# Patient Record
Sex: FEMALE | Race: ASIAN | NOT HISPANIC OR LATINO
[De-identification: names, ages, dates, MRNs, and addresses within clinical notes are randomized per-mention and may not be internally consistent; named-entity substitution may affect disease eponyms.]

---

## 2022-11-10 ENCOUNTER — NON-APPOINTMENT (OUTPATIENT)
Age: 62
End: 2022-11-10

## 2022-11-10 ENCOUNTER — APPOINTMENT (OUTPATIENT)
Dept: CARDIOLOGY | Facility: CLINIC | Age: 62
End: 2022-11-10

## 2022-11-10 VITALS
DIASTOLIC BLOOD PRESSURE: 81 MMHG | BODY MASS INDEX: 21.26 KG/M2 | OXYGEN SATURATION: 99 % | WEIGHT: 120 LBS | HEART RATE: 71 BPM | TEMPERATURE: 98.1 F | RESPIRATION RATE: 16 BRPM | HEIGHT: 63 IN | SYSTOLIC BLOOD PRESSURE: 155 MMHG

## 2022-11-10 DIAGNOSIS — Z86.39 PERSONAL HISTORY OF OTHER ENDOCRINE, NUTRITIONAL AND METABOLIC DISEASE: ICD-10-CM

## 2022-11-10 DIAGNOSIS — I10 ESSENTIAL (PRIMARY) HYPERTENSION: ICD-10-CM

## 2022-11-10 PROBLEM — Z00.00 ENCOUNTER FOR PREVENTIVE HEALTH EXAMINATION: Status: ACTIVE | Noted: 2022-11-10

## 2022-11-10 PROCEDURE — 93306 TTE W/DOPPLER COMPLETE: CPT

## 2022-11-10 PROCEDURE — 99204 OFFICE O/P NEW MOD 45 MIN: CPT | Mod: 25

## 2022-11-10 PROCEDURE — 93000 ELECTROCARDIOGRAM COMPLETE: CPT | Mod: 59

## 2022-11-10 NOTE — HISTORY OF PRESENT ILLNESS
[FreeTextEntry1] : 62 year old female with history of HTN, HLD, DM (a1c 6.6%) not on meds who presents for evaluation of chest pain and exertional SOB x 2-3 years.\par \par Pt states that 1 year ago, she went to a different cardiologist who did a treadmill stress with ?nuclear testing and stated that she had "equivocal" stress findings and subsequently recommended her to obtain LHC. She did not get this done because she was nervous for the procedure. At baseline, if she walks normally, she does not have symptoms. However, when she runs quickly or fast, she feels mid-sternal chest pain that radiates down from her throat and feels like a "bitter and burning" sensation. This is associated with shortness of breath. She also states that she would feel the same symptoms in the cold weather (wintertime) when cold wind blows towards her face, but did not feel these symptoms in the summer. She denies any palpitations, dizziness, lightheadedness, or LOC. No orthopnea/PND or LE edema. \par \par She takes amlodipine 5mg daily, losartan 25mg daily, and pravastatin 40mg daily.\par \par

## 2022-11-10 NOTE — ASSESSMENT
[FreeTextEntry1] : 62 year old female with history of HTN, HLD, DM (a1c 6.6%) not on meds who presents for evaluation of chest pain and exertional SOB x 2-3 years.\par \par Pt states that 1 year ago, she went to a different cardiologist who did a treadmill stress with ?nuclear testing and stated that she had "equivocal" stress findings and subsequently recommended her to obtain LHC. She did not get this done because she was nervous for the procedure. At baseline, if she walks normally, she does not have symptoms. However, when she runs quickly or fast, she feels mid-sternal chest pain that radiates down from her throat and feels like a "bitter and burning" sensation. This is associated with shortness of breath. She also states that she would feel the same symptoms in the cold weather (wintertime) when cold wind blows towards her face, but did not feel these symptoms in the summer. She denies any palpitations, dizziness, lightheadedness, or LOC. No orthopnea/PND or LE edema. \par \par She takes amlodipine 5mg daily, losartan 25mg daily, and pravastatin 40mg daily.\par \par \par 1) LBBB\par 2) chest discomfort, 2-3 years in duration\par - she is euvolemic on exam without signs/symptoms of HF\par - I advised TTE to assess for structural heart disease - showed low normal LVEF 50%, paradoxical septal motion, cannot r/o possible septal hypokinesis, and mild AI\par - in the setting of LBBB, her age, risk factors, symptoms, TTE findings, I advised CTA coronaries to assess for CAD (exercise not recommended as ECG un-interpretable and can cause false positive in MPI). Referral placed out to [facility] (Last BUN/Cr 20/0.68, 10/27/22). We discussed the possibility of needing LHC if CTA is abnormal. \par - on pravastatin 40mg daily for HLD, consider switching to atorvastatin as LDL was 167\par \par 3) HTN, 155/81\par - if still elevated at next visit, would favor increasing losartan\par \par 4) Follow-up, pending CTA coronaries\par \par

## 2022-11-22 ENCOUNTER — NON-APPOINTMENT (OUTPATIENT)
Age: 62
End: 2022-11-22

## 2022-11-29 ENCOUNTER — APPOINTMENT (OUTPATIENT)
Dept: CARDIOLOGY | Facility: CLINIC | Age: 62
End: 2022-11-29

## 2022-11-29 VITALS
SYSTOLIC BLOOD PRESSURE: 135 MMHG | HEART RATE: 76 BPM | WEIGHT: 118 LBS | TEMPERATURE: 97.8 F | DIASTOLIC BLOOD PRESSURE: 81 MMHG | OXYGEN SATURATION: 100 % | BODY MASS INDEX: 20.9 KG/M2 | RESPIRATION RATE: 18 BRPM

## 2022-11-29 PROCEDURE — 99215 OFFICE O/P EST HI 40 MIN: CPT

## 2022-11-29 NOTE — ASSESSMENT
[FreeTextEntry1] : 62 year old female with history of HTN, HLD, DM (a1c 6.6%) not on meds, LBBB who presents for f/u.\par \par Pt last seen 11/10/22 - underwent TTE showing low normal LVEF 50% with paradoxical septal motion and likely septal hypokinesis and mild AI. CTA 11/22/[address] [facility] which showed Ca 177 score, severe pLAD (70%), moderate pLCx (50-69%), and normal RCA.\par \par Pt returns today for f/u. She states she feels well. However, she still states she has mid-sternal chest discomfort that radiates up her throat when she runs quickly or for longer periods of times. No orthopnea, PND, LE edema, palpitations, or lightheadedness.\par \par She takes amlodipine 5mg daily, losartan 25mg daily, and pravastatin 40mg daily.\par \par 1) LBBB\par 2) chest discomfort, 2-3 years in duration\par 3) positive CTA coronaries\par - TTE 11/10/22 showed low normal LVEF 50%, paradoxical septal motion, likely septal hypokinesis, and mild AI\par - CTA 11/22/[address] done [address] [facility] which showed Ca 177 score, severe pLAD (70%), moderate pLCx (50-69%), and normal RCA\par - in setting of low normal EF, likely septal hypokinesis, and exertional chest discomfort, I advised pt to proceed for coronary angiogram ([facility] with Dr. Americo Maravilla). Pt is nervous and extremely hesitant to proceed. I had a long discussion regarding risks/benefits of [facility] in her clinical situation and pt endorsed understanding.\par - switch pravastatin to atorvastatin 40mg daily, might need 80mg pending repeat lipid panel\par - start ASA 81mg daily\par \par 4) HTN, 155/81\par - continue amlodipine and losartan 25mg daily\par \par 4) Follow-up, pending [facility]

## 2022-11-29 NOTE — HISTORY OF PRESENT ILLNESS
[FreeTextEntry1] : 62 year old female with history of HTN, HLD, DM (a1c 6.6%) not on meds, LBBB who presents for f/u.\par \par Pt last seen 11/10/22 - underwent TTE showing low normal LVEF 50% with paradoxical septal motion and likely septal hypokinesis and mild AI. CTA was done at [facility] which showed Ca 177 score, severe pLAD (70%), moderate pLCx (50-69%), and normal RCA.\par \par Pt returns today for f/u. She states she feels well. However, she still states she has mid-sternal chest discomfort that radiates up her throat when she runs quickly or for longer periods of times. No orthopnea, PND, LE edema, palpitations, or lightheadedness.\par \par She takes amlodipine 5mg daily, losartan 25mg daily, and pravastatin 40mg daily.\par

## 2022-12-05 ENCOUNTER — APPOINTMENT (OUTPATIENT)
Dept: CARDIOLOGY | Facility: CLINIC | Age: 62
End: 2022-12-05

## 2022-12-05 ENCOUNTER — NON-APPOINTMENT (OUTPATIENT)
Age: 62
End: 2022-12-05

## 2022-12-05 VITALS
TEMPERATURE: 96.8 F | WEIGHT: 117 LBS | RESPIRATION RATE: 16 BRPM | HEART RATE: 64 BPM | DIASTOLIC BLOOD PRESSURE: 79 MMHG | SYSTOLIC BLOOD PRESSURE: 133 MMHG | OXYGEN SATURATION: 98 % | BODY MASS INDEX: 20.73 KG/M2

## 2022-12-05 DIAGNOSIS — R06.02 SHORTNESS OF BREATH: ICD-10-CM

## 2022-12-05 PROCEDURE — 93000 ELECTROCARDIOGRAM COMPLETE: CPT

## 2022-12-05 PROCEDURE — 99215 OFFICE O/P EST HI 40 MIN: CPT | Mod: 25

## 2022-12-05 RX ORDER — NITROGLYCERIN 0.4 MG/1
0.4 TABLET SUBLINGUAL
Qty: 90 | Refills: 0 | Status: ACTIVE | COMMUNITY
Start: 2022-12-05 | End: 1900-01-01

## 2022-12-05 NOTE — ASSESSMENT
[FreeTextEntry1] : 62 year old female with history of HTN, HLD, DM (a1c 6.6%) not on meds, LBBB who presents for f/u.\par \par Pt states that she switched to atorvastatin and started ASA 81. She feels the ASA gives her mild chest discomfort/SOB. I advised her to try aspirin with her acid reflux medication. She states she still has chest discomfort when she runs very quickly but feels okay walking at normal pace.\par \par She takes amlodipine 5mg daily, losartan 25mg daily, and atorvastatin 40mg daily. She is on ASA 81mg daily.\par \par 1) LBBB\par 2) chest discomfort, 2-3 years in duration\par 3) positive CTA coronaries\par - TTE 11/10/22 showed low normal LVEF 50%, paradoxical septal motion, likely septal hypokinesis, and mild AI\par - CTA 11/22/22 was done at [facility] which showed Ca 177 score, severe pLAD (70%), moderate pLCx (50-69%), and normal RCA\par - in setting of low normal EF, likely septal hypokinesis, and exertional chest discomfort, I advised pt to proceed for coronary angiogram ([facility] with Dr. Americo Maravilla). Pt is nervous and extremely hesitant to proceed. I had a long discussion regarding risks/benefits of C in her clinical situation and pt endorsed understanding. We discussed that should she develop severe CP/SOB, she should proceed to ED for evaluation and not wait for scheduled appt\par - continue ASA 81mg and atorvastatin 40mg daily\par - start metoprolol succinate 25mg daily for anti-anginal effect\par - I Rx'ed SLNTG PRN\par \par 4) HTN,\par - continue amlodipine and losartan 25mg daily\par \par 4) Follow-up, pending C

## 2022-12-05 NOTE — HISTORY OF PRESENT ILLNESS
[FreeTextEntry1] : 62 year old female with history of HTN, HLD, DM (a1c 6.6%) not on meds, LBBB who presents for f/u.\par \par Pt states that she switched to atorvastatin and started ASA 81. She feels the ASA gives her mild chest discomfort/SOB. I advised her to try aspirin with her acid reflux medication. She states she still has chest discomfort when she runs very quickly but feels okay walking at normal pace. Pt brought in a bottle of metoprolol tartrate from her friend and asked if she could take it. \par \par 11/29/22- Pt returns today for f/u. She states she feels well. However, she still states she has mid-sternal chest discomfort that radiates up her throat when she runs quickly or for longer periods of times. No orthopnea, PND, LE edema, palpitations, or lightheadedness. Pravstatin was switched to atorvastatin 40mg at that time. We had long discussion to plan for LHC given septal hypokinesis, low normal EF, chest discomfort and positive coronary CT.\par \par 11/10/22 - underwent TTE showing low normal LVEF 50% with paradoxical septal motion and likely septal hypokinesis and mild AI. CTA was done at [facility] which showed Ca 177 score, severe pLAD (70%), moderate pLCx (50-69%), and normal RCA.\par \par She takes amlodipine 5mg daily, losartan 25mg daily, and atorvastatin 40mg daily.

## 2023-02-17 ENCOUNTER — APPOINTMENT (OUTPATIENT)
Dept: CARDIOLOGY | Facility: CLINIC | Age: 63
End: 2023-02-17
Payer: MEDICAID

## 2023-02-17 ENCOUNTER — NON-APPOINTMENT (OUTPATIENT)
Age: 63
End: 2023-02-17

## 2023-02-17 VITALS
SYSTOLIC BLOOD PRESSURE: 137 MMHG | RESPIRATION RATE: 18 BRPM | TEMPERATURE: 97.5 F | BODY MASS INDEX: 21.79 KG/M2 | HEART RATE: 81 BPM | OXYGEN SATURATION: 98 % | WEIGHT: 123 LBS | DIASTOLIC BLOOD PRESSURE: 80 MMHG

## 2023-02-17 DIAGNOSIS — R07.82 INTERCOSTAL PAIN: ICD-10-CM

## 2023-02-17 DIAGNOSIS — R07.9 CHEST PAIN, UNSPECIFIED: ICD-10-CM

## 2023-02-17 PROCEDURE — 99215 OFFICE O/P EST HI 40 MIN: CPT | Mod: 25

## 2023-02-17 PROCEDURE — 93000 ELECTROCARDIOGRAM COMPLETE: CPT

## 2023-02-17 NOTE — HISTORY OF PRESENT ILLNESS
[FreeTextEntry1] : 62 year old female with history of HTN, HLD, DM (a1c 6.6%) not on meds, LBBB, moderate-severe CAD, low normal LVEF 50% with likely septal hypokinesis who presents for f/u.\par \par Pt states that she still occasionally has mid-chest discomfort that radiates up the middle of her chest to her throat. She is taking the medications as prescribed but feels like she has mild stomach upset with ASA 81. She is nervous about LHC, which is scheduled for 2/21/23.\par \par 12/5/22 - Pt states that she switched to atorvastatin and started ASA 81. She feels the ASA gives her mild chest discomfort/SOB. I advised her to try aspirin with her acid reflux medication. She states she still has chest discomfort when she runs very quickly but feels okay walking at normal pace. Pt brought in a bottle of metoprolol tartrate from her friend and asked if she could take it. \par \par 11/29/22- Pt returns today for f/u. She states she feels well. However, she still states she has mid-sternal chest discomfort that radiates up her throat when she runs quickly or for longer periods of times. No orthopnea, PND, LE edema, palpitations, or lightheadedness. Pravstatin was switched to atorvastatin 40mg at that time. We had long discussion to plan for LHC given septal hypokinesis, low normal EF, chest discomfort and positive coronary CT.\par \par 11/10/22 - underwent TTE showing low normal LVEF 50% with paradoxical septal motion and likely septal hypokinesis and mild AI. CTA was done at [facility] which showed Ca 177 score, severe pLAD (70%), moderate pLCx (50-69%), and normal RCA.\par \par She takes amlodipine 5mg daily, losartan 25mg daily. She is on ASA 81 and atorvastatin 40mg daily. She also takes metoprolol succinate 25mg daily.

## 2023-02-17 NOTE — ASSESSMENT
[FreeTextEntry1] : 62 year old female with history of HTN, HLD, DM (a1c 6.6%) not on meds, LBBB, moderate-severe CAD, low normal LVEF 50% with likely septal hypokinesis who presents for f/u.\par \par Pt states that she still occasionally has mid-chest discomfort that radiates up the middle of her chest to her throat. She is taking the medications as prescribed but feels like she has mild stomach upset with ASA 81. She is nervous about LHC, which is scheduled for 2/21/23.\par \par 11/10/22 - underwent TTE showing low normal LVEF 50% with paradoxical septal motion and likely septal hypokinesis and mild AI. CTA was done at [facility] which showed Ca 177 score, severe pLAD (70%), moderate pLCx (50-69%), and normal RCA.\par \par She takes amlodipine 5mg daily, losartan 25mg daily. She is on ASA 81 and atorvastatin 40mg daily. She also takes metoprolol succinate 25mg daily.\par \par 1) LBBB\par 2) chest discomfort, 2-3 years in duration\par 3) positive CTA coronaries\par - TTE 11/10/22 showed low normal LVEF 50%, paradoxical septal motion, likely septal hypokinesis, and mild AI\par - CTA 11/22/22 was done at [facility] which showed Ca 177 score, severe pLAD (70%), moderate pLCx (50-69%), and normal RCA\par - in setting of low normal EF, likely septal hypokinesis, and exertional chest discomfort, I advised pt to proceed for coronary angiogram ([facility] with Dr. Americo Maravilla). She is scheduled for next week 2/21/23. Pt remains nervous and is still extremely hesitant to proceed. I had another long discussion regarding risks/benefits of LHC in her clinical situation and pt endorsed understanding. ED precautions of worsening CP/SOB was also discussed.\par - continue ASA 81mg and atorvastatin 40mg daily\par - continue metoprolol succinate 25mg daily for anti-anginal effect\par - SLNTG PRN\par \par 4) HTN,\par - continue amlodipine and losartan 25mg daily\par \par 5) Follow-up, pending LHC next week \par

## 2023-04-06 ENCOUNTER — APPOINTMENT (OUTPATIENT)
Dept: CARDIOLOGY | Facility: CLINIC | Age: 63
End: 2023-04-06
Payer: MEDICAID

## 2023-04-06 ENCOUNTER — NON-APPOINTMENT (OUTPATIENT)
Age: 63
End: 2023-04-06

## 2023-04-06 VITALS
WEIGHT: 123 LBS | DIASTOLIC BLOOD PRESSURE: 87 MMHG | SYSTOLIC BLOOD PRESSURE: 153 MMHG | HEART RATE: 75 BPM | BODY MASS INDEX: 21.79 KG/M2 | OXYGEN SATURATION: 99 % | TEMPERATURE: 96.8 F | RESPIRATION RATE: 18 BRPM

## 2023-04-06 PROCEDURE — 99214 OFFICE O/P EST MOD 30 MIN: CPT

## 2023-04-06 PROCEDURE — 93000 ELECTROCARDIOGRAM COMPLETE: CPT

## 2023-04-06 NOTE — REASON FOR VISIT
[FreeTextEntry1] : 62 year old female with  HTN, HLD, DM (a1c 6.6%) not on meds, LBBB, moderate-severe CAD by CTA, low normal LVEF 50% with likely septal hypokinesis who presents for f/u.\par \par Patient was last seen on 2/17/23 with Dr. Pacheco.  Pt stateed that she still occasionally had mid-chest discomfort that radiated up the middle of her chest to her throat. She was taking the medications as prescribed but feels like she has mild stomach upset with ASA 81. She was nervous about [facility], which was scheduled for 2/21/23.\par \par She takes amlodipine 5mg daily, losartan 25mg daily. She is on ASA 81 and atorvastatin 40mg daily. She also takes metoprolol succinate 25mg daily.\par \par Patient underwent an echocardiogram 11/10/22 and it showed low normal LV function EF 50% without significant valvular pathology.

## 2023-04-06 NOTE — ASSESSMENT
[FreeTextEntry1] : 62 year old female with history of HTN, HLD, DM (a1c 6.6%) not on meds, LBBB, moderate-severe CAD, low normal LVEF 50% with likely septal hypokinesis who presents for f/u.\par \par Pt states that she still occasionally has mid-chest discomfort that radiates up the middle of her chest to her throat. She is taking the medications as prescribed but feels like she has mild stomach upset with ASA 81. She is nervous about LHC, which is scheduled for 2/21/23.\par \par 11/10/22 - underwent TTE showing low normal LVEF 50% with paradoxical septal motion and likely septal hypokinesis and mild AI. CTA was done at [facility] which showed Ca 177 score, severe pLAD (70%), moderate pLCx (50-69%), and normal RCA.\par \par She takes amlodipine 5mg daily, losartan 25mg daily. She is on ASA 81 and atorvastatin 40mg daily. She also takes metoprolol succinate 25mg daily.\par \par 1) LBBB\par 2) chest discomfort, 2-3 years in duration\par 3) positive CTA coronaries\par - TTE 11/10/22 showed low normal LVEF 50%, paradoxical septal motion, likely septal hypokinesis, and mild AI\par - CTA 11/22/22 was done at [facility] which showed Ca 177 score, severe pLAD (70%), moderate pLCx (50-69%), and normal RCA\par - in setting of low normal EF, likely septal hypokinesis, and exertional chest discomfort, I advised pt to proceed for coronary angiogram ([facility] with Dr. Americo Maravilla). She is scheduled for next week 2/21/23. Pt remains nervous and is still extremely hesitant to proceed. I had another long discussion regarding risks/benefits of LHC in her clinical situation and pt endorsed understanding. ED precautions of worsening CP/SOB was also discussed.\par - continue ASA 81mg and atorvastatin 40mg daily\par - continue metoprolol succinate 25mg daily for anti-anginal effect\par - SLNTG PRN\par \par 4) HTN,\par - continue amlodipine and losartan 25mg daily\par \par 5) Follow-up, pending LHC next week \par

## 2023-04-06 NOTE — HISTORY OF PRESENT ILLNESS
[FreeTextEntry1] : 4/6/23 - Patient reports CP from throat to epigastric  and took SLNTG yesterday. Patient reports SSCP with exertion normally. She reports stomach discomfort, she had EGD in 2018 which was okay. I advised patient to stop ASA 81 mg and switch to Plavix 75 mg. I advised patient to have a trial of carafate. I advised patient to get BT for troponin. Patient reports palpitations lasting seconds. She did not go for cardiac cath as planned previously. \par \par \par 2/17/23 - Pt states that she still occasionally has mid-chest discomfort that radiates up the middle of her chest to her throat. She is taking the medications as prescribed but feels like she has mild stomach upset with ASA 81. She is nervous about LHC, which is scheduled for 2/21/23.\par \par 12/5/22 - Pt states that she switched to atorvastatin and started ASA 81. She feels the ASA gives her mild chest discomfort/SOB. I advised her to try aspirin with her acid reflux medication. She states she still has chest discomfort when she runs very quickly but feels okay walking at normal pace. Pt brought in a bottle of metoprolol tartrate from her friend and asked if she could take it. \par \par 11/29/22- Pt returns today for f/u. She states she feels well. However, she still states she has mid-sternal chest discomfort that radiates up her throat when she runs quickly or for longer periods of times. No orthopnea, PND, LE edema, palpitations, or lightheadedness. Pravstatin was switched to atorvastatin 40mg at that time. We had long discussion to plan for LHC given septal hypokinesis, low normal EF, chest discomfort and positive coronary CT.\par \par 11/10/22 - underwent TTE showing low normal LVEF 50% with paradoxical septal motion and likely septal hypokinesis and mild AI. CTA was done at [facility] which showed Ca 177 score, severe pLAD (70%), moderate pLCx (50-69%), and normal RCA.\par \par 11/10/22 - Pt states that 1 year ago, she went to a different cardiologist who did a treadmill stress with ?nuclear testing and stated that she had "equivocal" stress findings and subsequently recommended her to obtain LHC. She did not get this done because she was nervous for the procedure. At baseline, if she walks normally, she does not have symptoms. However, when she runs quickly or fast, she feels mid-sternal chest pain that radiates down from her throat and feels like a "bitter and burning" sensation. This is associated with shortness of breath. She also states that she would feel the same symptoms in the cold weather (wintertime) when cold wind blows towards her face, but did not feel these symptoms in the summer. She denies any palpitations, dizziness, lightheadedness, or LOC. No orthopnea/PND or LE edema.   She takes amlodipine 5mg daily, losartan 25mg daily, and pravastatin 40mg daily.

## 2023-04-11 LAB
ALBUMIN SERPL ELPH-MCNC: 4.6 G/DL
ALP BLD-CCNC: 67 U/L
ALT SERPL-CCNC: 23 U/L
ANION GAP SERPL CALC-SCNC: 12 MMOL/L
AST SERPL-CCNC: 21 U/L
BILIRUB SERPL-MCNC: 0.5 MG/DL
BUN SERPL-MCNC: 20 MG/DL
CALCIUM SERPL-MCNC: 10.1 MG/DL
CHLORIDE SERPL-SCNC: 104 MMOL/L
CO2 SERPL-SCNC: 26 MMOL/L
CREAT SERPL-MCNC: 0.8 MG/DL
DEPRECATED D DIMER PPP IA-ACNC: <150 NG/ML DDU
EGFR: 83 ML/MIN/1.73M2
GLUCOSE SERPL-MCNC: 105 MG/DL
NT-PROBNP SERPL-MCNC: 277 PG/ML
POTASSIUM SERPL-SCNC: 4.1 MMOL/L
PROT SERPL-MCNC: 7.5 G/DL
SODIUM SERPL-SCNC: 142 MMOL/L
TROPONIN I SERPL-MCNC: <0.01 NG/ML

## 2023-04-12 ENCOUNTER — NON-APPOINTMENT (OUTPATIENT)
Age: 63
End: 2023-04-12

## 2023-05-15 ENCOUNTER — APPOINTMENT (OUTPATIENT)
Dept: CARDIOLOGY | Facility: CLINIC | Age: 63
End: 2023-05-15
Payer: MEDICAID

## 2023-05-15 ENCOUNTER — NON-APPOINTMENT (OUTPATIENT)
Age: 63
End: 2023-05-15

## 2023-05-15 VITALS
TEMPERATURE: 97.8 F | OXYGEN SATURATION: 100 % | DIASTOLIC BLOOD PRESSURE: 76 MMHG | RESPIRATION RATE: 18 BRPM | BODY MASS INDEX: 21.26 KG/M2 | WEIGHT: 120 LBS | HEART RATE: 71 BPM | SYSTOLIC BLOOD PRESSURE: 128 MMHG

## 2023-05-15 PROCEDURE — 99213 OFFICE O/P EST LOW 20 MIN: CPT | Mod: 25

## 2023-05-15 PROCEDURE — 93000 ELECTROCARDIOGRAM COMPLETE: CPT

## 2023-05-15 RX ORDER — ASPIRIN 81 MG/1
81 TABLET ORAL
Qty: 90 | Refills: 1 | Status: DISCONTINUED | COMMUNITY
Start: 2022-11-29 | End: 2023-05-15

## 2023-05-16 NOTE — ASSESSMENT
[FreeTextEntry1] : 62 year old female with history of HTN, HLD, DM (a1c 6.6%) not on meds, LBBB, moderate-severe CAD, low normal LVEF 50% with likely septal hypokinesis who presents for f/u.\par \par Pt states that she still occasionally has mid-chest discomfort that radiates up the middle of her chest to her throat. She is taking the medications as prescribed but feels like she has mild stomach upset with ASA 81. She is nervous about LHC, which is scheduled for 2/21/23.\par \par 11/10/22 - underwent TTE showing low normal LVEF 50% with paradoxical septal motion and likely septal hypokinesis and mild AI. CTA was done at [facility] which showed Ca 177 score, severe pLAD (70%), moderate pLCx (50-69%), and normal RCA.\par \par She takes amlodipine 5mg daily, losartan 25mg daily. She is on ASA 81 and atorvastatin 40mg daily. She also takes metoprolol succinate 25mg daily.\par \par 1) LBBB\par 2) chest discomfort, 2-3 years in duration\par 3) positive CTA coronaries\par - TTE 11/10/22 showed low normal LVEF 50%, paradoxical septal motion, likely septal hypokinesis, and mild AI\par - CTA 11/22/22 was done at [facility] which showed Ca 177 score, severe pLAD (70%), moderate pLCx (50-69%), and normal RCA\par - in setting of low normal EF, likely septal hypokinesis, and exertional chest discomfort, I advised pt to proceed for coronary angiogram ([facility] with Dr. Americo Maravilla). She is scheduled for next week 2/21/23. Pt remains nervous and is still extremely hesitant to proceed. I had another long discussion regarding risks/benefits of LHC in her clinical situation and pt endorsed understanding. ED precautions of worsening CP/SOB was also discussed.\par - continue ASA 81mg and atorvastatin 40mg daily\par - continue metoprolol succinate 25mg daily for anti-anginal effect\par - SLNTG PRN\par \par 4) HTN,\par - continue amlodipine and losartan 25mg daily\par \par 5) Follow-up, pending LHC next week \par

## 2023-05-16 NOTE — REVIEW OF SYSTEMS
[Chest Discomfort] : chest discomfort [Negative] : Heme/Lymph [SOB] : no shortness of breath [Dyspnea on exertion] : not dyspnea during exertion [Lower Ext Edema] : no extremity edema [Leg Claudication] : no intermittent leg claudication [Orthopnea] : no orthopnea [Palpitations] : no palpitations [PND] : no PND [Syncope] : no syncope

## 2023-05-16 NOTE — DISCUSSION/SUMMARY
[FreeTextEntry1] : 62 year old female with moderate-severe CAD by CTA with calcium score of 177, HTN, HLD, DM (a1c 6.6%) not on meds, LBBB, presents for followup.\par \par Patient was last seen on 4/6/23 - Patient reports CP from throat to epigastric  and took SLNTG yesterday. Patient reports SSCP with exertion normally. She reports stomach discomfort, she had EGD in 2018 which was okay. I advised patient to stop ASA 81 mg and switch to Plavix 75 mg. I advised patient to have a trial of carafate. I advised patient to get BT for troponin. Patient reports palpitations lasting seconds. She did not go for cardiac cath as planned previously. \par \par She is on Metoprolol ER 25 mg, Amlodipine 5 mg , Losartan 25 mg for HTN. She is on Plavix 75 mg and Atorvastatin 40 mg daily.  \par \par Patient underwent an echocardiogram 11/10/22 and it showed low normal LV function EF 50% without significant valvular pathology. \par \par 5/15/23 - Patient reports CP is better after switching ASA 81 mg to Plavix 75 mg and taking Carafate.  Patient needs refills.  I advised patient to undergo EGD.  I will consider cardiac cath if EGD is unremarkable.  Patient is cleared for EGD and anesthesia.  She may hold Plavix 75 mg for 5 days prior to procedure.\par \par (1) Chest discomfort, CAD by CTA - Patient reports CP is better after switching ASA 81 mg to Plavix 75 mg and taking Carafate.  Patient needs refills.  I advised patient to undergo EGD.  I will consider cardiac cath if EGD is unremarkable.  I advised patient to continue Plavix 75 mg and Atorvastatin 40 mg for now.\par \par (2) Cardiac clearance prior to EGD - Patient has been stable.  Patient is cleared for EGD and anesthesia.  She may hold Plavix 75 mg for 5 days prior to procedure.\par \par (3) HTN - Her BP was good.  I advised patient to continue Metoprolol ER 25 mg, Amlodipine 5 mg , Losartan 25 mg for HTN\par \par (4) Followup - pending EGD.\par

## 2023-05-16 NOTE — HISTORY OF PRESENT ILLNESS
[FreeTextEntry1] : 5/15/23 - Patient reports CP is better after switching ASA 81 mg to Plavix 75 mg and taking Carafate.  Patient needs refills.  I advised patient to undergo EGD.  I will consider cardiac cath if EGD is unremarkable.  Patient is cleared for EGD and anesthesia.  She may hold Plavix 75 mg for 5 days prior to procedure.\par \par 4/6/23 - Patient reports CP from throat to epigastric  and took SLNTG yesterday. Patient reports SSCP with exertion normally. She reports stomach discomfort, she had EGD in 2018 which was okay. I advised patient to stop ASA 81 mg and switch to Plavix 75 mg. I advised patient to have a trial of carafate. I advised patient to get BT for troponin. Patient reports palpitations lasting seconds. She did not go for cardiac cath as planned previously. \par \par 2/17/23 - Pt states that she still occasionally has mid-chest discomfort that radiates up the middle of her chest to her throat. She is taking the medications as prescribed but feels like she has mild stomach upset with ASA 81. She is nervous about LHC, which is scheduled for 2/21/23.\par \par 12/5/22 - Pt states that she switched to atorvastatin and started ASA 81. She feels the ASA gives her mild chest discomfort/SOB. I advised her to try aspirin with her acid reflux medication. She states she still has chest discomfort when she runs very quickly but feels okay walking at normal pace. Pt brought in a bottle of metoprolol tartrate from her friend and asked if she could take it. \par \par 11/29/22- Pt returns today for f/u. She states she feels well. However, she still states she has mid-sternal chest discomfort that radiates up her throat when she runs quickly or for longer periods of times. No orthopnea, PND, LE edema, palpitations, or lightheadedness. Pravstatin was switched to atorvastatin 40mg at that time. We had long discussion to plan for LHC given septal hypokinesis, low normal EF, chest discomfort and positive coronary CT.\par \par 11/10/22 - underwent TTE showing low normal LVEF 50% with paradoxical septal motion and likely septal hypokinesis and mild AI. CTA was done at [facility] which showed Ca 177 score, severe pLAD (70%), moderate pLCx (50-69%), and normal RCA.\par \par 11/10/22 - Pt states that 1 year ago, she went to a different cardiologist who did a treadmill stress with ?nuclear testing and stated that she had "equivocal" stress findings and subsequently recommended her to obtain LHC. She did not get this done because she was nervous for the procedure. At baseline, if she walks normally, she does not have symptoms. However, when she runs quickly or fast, she feels mid-sternal chest pain that radiates down from her throat and feels like a "bitter and burning" sensation. This is associated with shortness of breath. She also states that she would feel the same symptoms in the cold weather (wintertime) when cold wind blows towards her face, but did not feel these symptoms in the summer. She denies any palpitations, dizziness, lightheadedness, or LOC. No orthopnea/PND or LE edema.   She takes amlodipine 5mg daily, losartan 25mg daily, and pravastatin 40mg daily.

## 2023-05-16 NOTE — REASON FOR VISIT
[FreeTextEntry1] : 62 year old female with  HTN, HLD, DM (a1c 6.6%) not on meds, LBBB, moderate-severe CAD by CTA with calcium score of 177, low normal LVEF 50% with likely septal hypokinesis who presents for f/u.\par \par Patient was last seen on 4/6/23 - Patient reports CP from throat to epigastric  and took SLNTG yesterday. Patient reports SSCP with exertion normally. She reports stomach discomfort, she had EGD in 2018 which was okay. I advised patient to stop ASA 81 mg and switch to Plavix 75 mg. I advised patient to have a trial of carafate. I advised patient to get BT for troponin. Patient reports palpitations lasting seconds. She did not go for cardiac cath as planned previously. \par \par She is on Metoprolol ER 25 mg, Amlodipine 5 mg , Losartan 25 mg for HTN. She is on Plavix 75 mg and Atorvastatin 40 mg daily.\par \par Patient underwent an echocardiogram 11/10/22 and it showed low normal LV function EF 50% without significant valvular pathology.

## 2023-06-26 ENCOUNTER — APPOINTMENT (OUTPATIENT)
Dept: CARDIOLOGY | Facility: CLINIC | Age: 63
End: 2023-06-26
Payer: MEDICAID

## 2023-06-26 ENCOUNTER — NON-APPOINTMENT (OUTPATIENT)
Age: 63
End: 2023-06-26

## 2023-06-26 VITALS
RESPIRATION RATE: 18 BRPM | DIASTOLIC BLOOD PRESSURE: 69 MMHG | TEMPERATURE: 98 F | WEIGHT: 121 LBS | HEART RATE: 93 BPM | OXYGEN SATURATION: 96 % | SYSTOLIC BLOOD PRESSURE: 105 MMHG | BODY MASS INDEX: 21.43 KG/M2

## 2023-06-26 PROCEDURE — 93000 ELECTROCARDIOGRAM COMPLETE: CPT

## 2023-06-26 PROCEDURE — 99213 OFFICE O/P EST LOW 20 MIN: CPT | Mod: 25

## 2023-06-26 NOTE — REASON FOR VISIT
[FreeTextEntry1] : 62 year old female with  HTN, HLD, DM (a1c 6.6%) not on meds, LBBB, moderate-severe CAD by CTA with calcium score of 177, low normal LVEF 50% with likely septal hypokinesis who presents for f/u.\par \par Patient was last seen on 5/15/23 - Patient reports CP is better after switching ASA 81 mg to Plavix 75 mg and taking Carafate.  Patient needs refills.  I advised patient to undergo EGD.  I will consider cardiac cath if EGD is unremarkable.  Patient is cleared for EGD and anesthesia.  She may hold Plavix 75 mg for 5 days prior to procedure.\par \par She is on Metoprolol ER 25 mg, Amlodipine 5 mg , Losartan 25 mg for HTN. She is on Plavix 75 mg and Atorvastatin 40 mg daily.\par \par Patient underwent an echocardiogram 11/10/22 and it showed low normal LV function EF 50% without significant valvular pathology.

## 2023-08-08 ENCOUNTER — APPOINTMENT (OUTPATIENT)
Dept: CARDIOLOGY | Facility: CLINIC | Age: 63
End: 2023-08-08
Payer: MEDICAID

## 2023-08-08 VITALS
DIASTOLIC BLOOD PRESSURE: 79 MMHG | WEIGHT: 116 LBS | OXYGEN SATURATION: 100 % | BODY MASS INDEX: 20.55 KG/M2 | HEART RATE: 67 BPM | SYSTOLIC BLOOD PRESSURE: 127 MMHG | RESPIRATION RATE: 16 BRPM | TEMPERATURE: 97.6 F

## 2023-08-08 PROCEDURE — 99213 OFFICE O/P EST LOW 20 MIN: CPT

## 2023-08-27 NOTE — HISTORY OF PRESENT ILLNESS
[FreeTextEntry1] : 8/8/23 - Patient reports that her CP is better by 40% but is still persistent. Patient reports CP when anxious and palpitations with heartburn. SOB lasting 1-2 minutes/ 2-3 times a month. Patient is considering cardiac cath after one month. Patient repeated breath test today.   6/26/23 - Patient reports SSCP.  EGD showed esophagitis and gastritis and H. Pylori.  Patient cannot tolerate ASA.     5/15/23 - Patient reports CP is better after switching ASA 81 mg to Plavix 75 mg and taking Carafate.  Patient needs refills.  I advised patient to undergo EGD.  I will consider cardiac cath if EGD is unremarkable.  Patient is cleared for EGD and anesthesia.  She may hold Plavix 75 mg for 5 days prior to procedure.  4/6/23 - Patient reports CP from throat to epigastric  and took SLNTG yesterday. Patient reports SSCP with exertion normally. She reports stomach discomfort, she had EGD in 2018 which was okay. I advised patient to stop ASA 81 mg and switch to Plavix 75 mg. I advised patient to have a trial of carafate. I advised patient to get BT for troponin. Patient reports palpitations lasting seconds. She did not go for cardiac cath as planned previously.   2/17/23 - Pt states that she still occasionally has mid-chest discomfort that radiates up the middle of her chest to her throat. She is taking the medications as prescribed but feels like she has mild stomach upset with ASA 81. She is nervous about [facility], which is scheduled for 2/21/23.  12/5/22 - Pt states that she switched to atorvastatin and started ASA 81. She feels the ASA gives her mild chest discomfort/SOB. I advised her to try aspirin with her acid reflux medication. She states she still has chest discomfort when she runs very quickly but feels okay walking at normal pace. Pt brought in a bottle of metoprolol tartrate from her friend and asked if she could take it.   11/29/22- Pt returns today for f/u. She states she feels well. However, she still states she has mid-sternal chest discomfort that radiates up her throat when she runs quickly or for longer periods of times. No orthopnea, PND, LE edema, palpitations, or lightheadedness. Pravstatin was switched to atorvastatin 40mg at that time. We had long discussion to plan for LHC given septal hypokinesis, low normal EF, chest discomfort and positive coronary CT.  11/10/22 - underwent TTE showing low normal LVEF 50% with paradoxical septal motion and likely septal hypokinesis and mild AI. CTA was done at [facility] which showed Ca 177 score, severe pLAD (70%), moderate pLCx (50-69%), and normal RCA.  11/10/22 - Pt states that 1 year ago, she went to a different cardiologist who did a treadmill stress with ?nuclear testing and stated that she had "equivocal" stress findings and subsequently recommended her to obtain LHC. She did not get this done because she was nervous for the procedure. At baseline, if she walks normally, she does not have symptoms. However, when she runs quickly or fast, she feels mid-sternal chest pain that radiates down from her throat and feels like a "bitter and burning" sensation. This is associated with shortness of breath. She also states that she would feel the same symptoms in the cold weather (wintertime) when cold wind blows towards her face, but did not feel these symptoms in the summer. She denies any palpitations, dizziness, lightheadedness, or LOC. No orthopnea/PND or LE edema.   She takes amlodipine 5mg daily, losartan 25mg daily, and pravastatin 40mg daily.

## 2023-09-06 ENCOUNTER — NON-APPOINTMENT (OUTPATIENT)
Age: 63
End: 2023-09-06

## 2023-09-06 ENCOUNTER — APPOINTMENT (OUTPATIENT)
Dept: CARDIOLOGY | Facility: CLINIC | Age: 63
End: 2023-09-06
Payer: MEDICAID

## 2023-09-06 VITALS
BODY MASS INDEX: 21.43 KG/M2 | SYSTOLIC BLOOD PRESSURE: 117 MMHG | OXYGEN SATURATION: 96 % | DIASTOLIC BLOOD PRESSURE: 75 MMHG | HEART RATE: 78 BPM | WEIGHT: 121 LBS | TEMPERATURE: 97.6 F | RESPIRATION RATE: 18 BRPM

## 2023-09-06 DIAGNOSIS — I44.7 LEFT BUNDLE-BRANCH BLOCK, UNSPECIFIED: ICD-10-CM

## 2023-09-06 PROCEDURE — 99214 OFFICE O/P EST MOD 30 MIN: CPT | Mod: 25

## 2023-09-06 PROCEDURE — 93000 ELECTROCARDIOGRAM COMPLETE: CPT

## 2023-09-06 NOTE — REASON FOR VISIT
[FreeTextEntry1] : 62 year old female with moderate-severe CAD by CTA with calcium score of 177 (11/2022), HTN, HLD, pre-DM (a1c 6.6%) not on meds, LBBB,  low normal LVEF 50% with likely septal hypokinesis, who presents for f/u.  Patient was last seen on 8/8/23 - Patient reports that her CP is better by 40% but is still persistent. Patient reports CP when anxious and palpitations with heartburn. SOB lasting 1-2 minutes/ 2-3 times a month. Patient is considering cardiac cath after one month. Patient repeated breath test today.   She is on Metoprolol ER 25 mg, Amlodipine 5 mg , Losartan 25 mg for HTN. She is on Plavix 75 mg and Atorvastatin 40 mg daily.  She is on DM medication.  Patient underwent an echocardiogram 11/10/22 and it showed low normal LV function EF 50% without significant valvular pathology.

## 2023-09-06 NOTE — HISTORY OF PRESENT ILLNESS
[FreeTextEntry1] : 9/6/23 - Patient reports that she had a repeat breath test and was advised to have a repeat treatment for presumed H. Pylori.  Patient could not tolerate and stopped after 10 days of 14 day course.  She continues to have lower CP not related to exertion.  Patient reports throat discomfort.  Patient reports back of neck discomfort.  She is on DM medications now.  She is on Metoprolol ER 25 mg, Amlodipine 5 mg , Losartan 25 mg for HTN. She is on Plavix 75 mg and Atorvastatin 40 mg daily.  I advised patient to undergo a cardiac cath.  Patient requests [facility].  8/8/23 - Patient reports that her CP is better by 40% but is still persistent. Patient reports CP when anxious and palpitations with heartburn. SOB lasting 1-2 minutes/ 2-3 times a month. Patient is considering cardiac cath after one month. Patient repeated breath test today.   6/26/23 - Patient reports SSCP.  EGD showed esophagitis and gastritis and H. Pylori.  Patient cannot tolerate ASA.    5/15/23 - Patient reports CP is better after switching ASA 81 mg to Plavix 75 mg and taking Carafate.  Patient needs refills.  I advised patient to undergo EGD.  I will consider cardiac cath if EGD is unremarkable.  Patient is cleared for EGD and anesthesia.  She may hold Plavix 75 mg for 5 days prior to procedure.  4/6/23 - Patient reports CP from throat to epigastric  and took SLNTG yesterday. Patient reports SSCP with exertion normally. She reports stomach discomfort, she had EGD in 2018 which was okay. I advised patient to stop ASA 81 mg and switch to Plavix 75 mg. I advised patient to have a trial of carafate. I advised patient to get BT for troponin. Patient reports palpitations lasting seconds. She did not go for cardiac cath as planned previously.   2/17/23 - Pt states that she still occasionally has mid-chest discomfort that radiates up the middle of her chest to her throat. She is taking the medications as prescribed but feels like she has mild stomach upset with ASA 81. She is nervous about LHC, which is scheduled for 2/21/23.  12/5/22 - Pt states that she switched to atorvastatin and started ASA 81. She feels the ASA gives her mild chest discomfort/SOB. I advised her to try aspirin with her acid reflux medication. She states she still has chest discomfort when she runs very quickly but feels okay walking at normal pace. Pt brought in a bottle of metoprolol tartrate from her friend and asked if she could take it.   11/29/22- Pt returns today for f/u. She states she feels well. However, she still states she has mid-sternal chest discomfort that radiates up her throat when she runs quickly or for longer periods of times. No orthopnea, PND, LE edema, palpitations, or lightheadedness. Pravstatin was switched to atorvastatin 40mg at that time. We had long discussion to plan for LHC given septal hypokinesis, low normal EF, chest discomfort and positive coronary CT.  11/10/22 - underwent TTE showing low normal LVEF 50% with paradoxical septal motion and likely septal hypokinesis and mild AI. CTA was done at [facility] which showed Ca 177 score, severe pLAD (70%), moderate pLCx (50-69%), and normal RCA.  11/10/22 - Pt states that 1 year ago, she went to a different cardiologist who did a treadmill stress with ?nuclear testing and stated that she had "equivocal" stress findings and subsequently recommended her to obtain LHC. She did not get this done because she was nervous for the procedure. At baseline, if she walks normally, she does not have symptoms. However, when she runs quickly or fast, she feels mid-sternal chest pain that radiates down from her throat and feels like a "bitter and burning" sensation. This is associated with shortness of breath. She also states that she would feel the same symptoms in the cold weather (wintertime) when cold wind blows towards her face, but did not feel these symptoms in the summer. She denies any palpitations, dizziness, lightheadedness, or LOC. No orthopnea/PND or LE edema.   She takes amlodipine 5mg daily, losartan 25mg daily, and pravastatin 40mg daily.

## 2023-09-21 ENCOUNTER — NON-APPOINTMENT (OUTPATIENT)
Age: 63
End: 2023-09-21

## 2023-10-30 ENCOUNTER — NON-APPOINTMENT (OUTPATIENT)
Age: 63
End: 2023-10-30

## 2023-10-30 ENCOUNTER — APPOINTMENT (OUTPATIENT)
Dept: CARDIOLOGY | Facility: CLINIC | Age: 63
End: 2023-10-30
Payer: MEDICAID

## 2023-10-30 VITALS
TEMPERATURE: 97.2 F | OXYGEN SATURATION: 100 % | BODY MASS INDEX: 21.61 KG/M2 | WEIGHT: 122 LBS | RESPIRATION RATE: 16 BRPM | SYSTOLIC BLOOD PRESSURE: 132 MMHG | HEART RATE: 85 BPM | DIASTOLIC BLOOD PRESSURE: 79 MMHG

## 2023-10-30 DIAGNOSIS — I42.9 CARDIOMYOPATHY, UNSPECIFIED: ICD-10-CM

## 2023-10-30 PROCEDURE — 93000 ELECTROCARDIOGRAM COMPLETE: CPT

## 2023-10-30 PROCEDURE — 99213 OFFICE O/P EST LOW 20 MIN: CPT | Mod: 25

## 2023-10-30 RX ORDER — ATORVASTATIN CALCIUM 40 MG/1
40 TABLET, FILM COATED ORAL
Qty: 90 | Refills: 1 | Status: ACTIVE | COMMUNITY
Start: 2022-11-29 | End: 1900-01-01

## 2023-10-30 RX ORDER — ASPIRIN ENTERIC COATED TABLETS 81 MG 81 MG/1
81 TABLET, DELAYED RELEASE ORAL DAILY
Qty: 90 | Refills: 1 | Status: ACTIVE | COMMUNITY
Start: 2023-10-30 | End: 1900-01-01

## 2023-10-30 RX ORDER — CLOPIDOGREL BISULFATE 75 MG/1
75 TABLET, FILM COATED ORAL DAILY
Qty: 90 | Refills: 1 | Status: ACTIVE | COMMUNITY
Start: 2023-04-06 | End: 1900-01-01

## 2023-10-30 RX ORDER — METOPROLOL SUCCINATE 25 MG/1
25 TABLET, EXTENDED RELEASE ORAL DAILY
Qty: 90 | Refills: 1 | Status: ACTIVE | COMMUNITY
Start: 2022-12-05 | End: 1900-01-01

## 2023-10-30 RX ORDER — FAMOTIDINE 20 MG/1
20 TABLET, FILM COATED ORAL
Qty: 90 | Refills: 1 | Status: ACTIVE | COMMUNITY
Start: 2023-10-30 | End: 1900-01-01

## 2023-10-31 PROBLEM — I42.9 CARDIOMYOPATHY, UNSPECIFIED TYPE: Status: ACTIVE | Noted: 2023-10-31

## 2023-12-22 ENCOUNTER — APPOINTMENT (OUTPATIENT)
Dept: CARDIOLOGY | Facility: CLINIC | Age: 63
End: 2023-12-22
Payer: MEDICAID

## 2023-12-22 ENCOUNTER — NON-APPOINTMENT (OUTPATIENT)
Age: 63
End: 2023-12-22

## 2023-12-22 VITALS
WEIGHT: 120 LBS | SYSTOLIC BLOOD PRESSURE: 124 MMHG | BODY MASS INDEX: 21.26 KG/M2 | RESPIRATION RATE: 16 BRPM | DIASTOLIC BLOOD PRESSURE: 75 MMHG | TEMPERATURE: 96.8 F | OXYGEN SATURATION: 97 % | HEART RATE: 89 BPM

## 2023-12-22 DIAGNOSIS — R07.89 OTHER CHEST PAIN: ICD-10-CM

## 2023-12-22 DIAGNOSIS — Z95.820 PERIPHERAL VASCULAR ANGIOPLASTY STATUS WITH IMPLANTS AND GRAFTS: ICD-10-CM

## 2023-12-22 DIAGNOSIS — I25.10 ATHEROSCLEROTIC HEART DISEASE OF NATIVE CORONARY ARTERY W/OUT ANGINA PECTORIS: ICD-10-CM

## 2023-12-22 PROCEDURE — 99213 OFFICE O/P EST LOW 20 MIN: CPT | Mod: 25

## 2023-12-22 PROCEDURE — 93306 TTE W/DOPPLER COMPLETE: CPT

## 2023-12-22 PROCEDURE — 93000 ELECTROCARDIOGRAM COMPLETE: CPT

## 2023-12-30 PROBLEM — Z95.820 S/P ANGIOPLASTY WITH STENT: Status: ACTIVE | Noted: 2023-10-31

## 2023-12-30 PROBLEM — I25.10 CAD (CORONARY ARTERY DISEASE): Status: ACTIVE | Noted: 2022-11-29

## 2023-12-30 NOTE — HISTORY OF PRESENT ILLNESS
[FreeTextEntry1] : 10/30/23 - Patient reports left-sided CP not related to exertion.  She is on Amlodipine 5 mg , Losartan 25 mg for HTN. She is on ASA 81 mg, Plavix 75 mg, Atorvastatin 40 mg for CAD.  HR a little elevated.  I advised patient to resume Metoprolol ER 25 mg.  I advised patient to resume Famotidine 20 mg.  FU 3 months.    9/6/23 - Patient reports that she had a repeat breath test and was advised to have a repeat treatment for presumed H. Pylori.  Patient could not tolerate and stopped after 10 days of 14 day course.  She continues to have lower CP not related to exertion.  Patient reports throat discomfort.  Patient reports back of neck discomfort.  She is on DM medications now.  She is on Metoprolol ER 25 mg, Amlodipine 5 mg , Losartan 25 mg for HTN. She is on Plavix 75 mg and Atorvastatin 40 mg daily.  I advised patient to undergo a cardiac cath.  Patient requests [facility].  Cath 9/21/23 at [facility] with Dr. Long showed 50-60% LAD, 80-85% LCx, LVEF 50% with apical AK, LVEDP 18, s/p LCx stent.  8/8/23 - Patient reports that her CP is better by 40% but is still persistent. Patient reports CP when anxious and palpitations with heartburn. SOB lasting 1-2 minutes/ 2-3 times a month. Patient is considering cardiac cath after one month. Patient repeated breath test today.   6/26/23 - Patient reports SSCP.  EGD showed esophagitis and gastritis and H. Pylori.  Patient cannot tolerate ASA.    5/15/23 - Patient reports CP is better after switching ASA 81 mg to Plavix 75 mg and taking Carafate.  Patient needs refills.  I advised patient to undergo EGD.  I will consider cardiac cath if EGD is unremarkable.  Patient is cleared for EGD and anesthesia.  She may hold Plavix 75 mg for 5 days prior to procedure.  4/6/23 - Patient reports CP from throat to epigastric  and took SLNTG yesterday. Patient reports SSCP with exertion normally. She reports stomach discomfort, she had EGD in 2018 which was okay. I advised patient to stop ASA 81 mg and switch to Plavix 75 mg. I advised patient to have a trial of carafate. I advised patient to get BT for troponin. Patient reports palpitations lasting seconds. She did not go for cardiac cath as planned previously.   2/17/23 - Pt states that she still occasionally has mid-chest discomfort that radiates up the middle of her chest to her throat. She is taking the medications as prescribed but feels like she has mild stomach upset with ASA 81. She is nervous about LHC, which is scheduled for 2/21/23.  12/5/22 - Pt states that she switched to atorvastatin and started ASA 81. She feels the ASA gives her mild chest discomfort/SOB. I advised her to try aspirin with her acid reflux medication. She states she still has chest discomfort when she runs very quickly but feels okay walking at normal pace. Pt brought in a bottle of metoprolol tartrate from her friend and asked if she could take it.   11/29/22- Pt returns today for f/u. She states she feels well. However, she still states she has mid-sternal chest discomfort that radiates up her throat when she runs quickly or for longer periods of times. No orthopnea, PND, LE edema, palpitations, or lightheadedness. Pravstatin was switched to atorvastatin 40mg at that time. We had long discussion to plan for LHC given septal hypokinesis, low normal EF, chest discomfort and positive coronary CT.  11/10/22 - underwent TTE showing low normal LVEF 50% with paradoxical septal motion and likely septal hypokinesis and mild AI. CTA was done at [facility] which showed Ca 177 score, severe pLAD (70%), moderate pLCx (50-69%), and normal RCA.  11/10/22 - Pt states that 1 year ago, she went to a different cardiologist who did a treadmill stress with ?nuclear testing and stated that she had "equivocal" stress findings and subsequently recommended her to obtain LHC. She did not get this done because she was nervous for the procedure. At baseline, if she walks normally, she does not have symptoms. However, when she runs quickly or fast, she feels mid-sternal chest pain that radiates down from her throat and feels like a "bitter and burning" sensation. This is associated with shortness of breath. She also states that she would feel the same symptoms in the cold weather (wintertime) when cold wind blows towards her face, but did not feel these symptoms in the summer. She denies any palpitations, dizziness, lightheadedness, or LOC. No orthopnea/PND or LE edema.   She takes amlodipine 5mg daily, losartan 25mg daily, and pravastatin 40mg daily.

## 2023-12-30 NOTE — REASON FOR VISIT
[FreeTextEntry1] : 63 year old female with moderate-severe CAD by CTA with calcium score of 177 (11/2022), s/p LCx stent 9/21/23 at [facility], cardiomyopathy EF 50%, LBBB, HTN, HLD, pre-DM (6.6%), who presents for followup.  Patient was last seen on 10/30/23 - Patient reports left-sided CP not related to exertion.  She is on Amlodipine 5 mg , Losartan 25 mg for HTN. She is on ASA 81 mg, Plavix 75 mg, Atorvastatin 40 mg for CAD.  HR a little elevated.  I advised patient to resume Metoprolol ER 25 mg.  I advised patient to resume Famotidine 20 mg.  FU 3 months.    She is on Amlodipine 5 mg , Losartan 25 mg for HTN. She is on ASA 81 mg, Plavix 75 mg, Atorvastatin 40 mg for CAD.  She is on DM medication.  She is OFF Metoprolol ER 25 mg.  Cath 9/21/23 at [facility] with Dr. Long showed 50-60% LAD, 80-85% LCx, LVEF 50% with apical AK, LVEDP 18, s/p LCx stent.  Patient underwent an echocardiogram 11/10/22 and it showed low normal LV function EF 50% without significant valvular pathology.

## 2024-04-16 ENCOUNTER — APPOINTMENT (OUTPATIENT)
Dept: CARDIOLOGY | Facility: CLINIC | Age: 64
End: 2024-04-16
Payer: COMMERCIAL

## 2024-04-16 VITALS
OXYGEN SATURATION: 97 % | WEIGHT: 114 LBS | BODY MASS INDEX: 20.19 KG/M2 | SYSTOLIC BLOOD PRESSURE: 115 MMHG | HEART RATE: 78 BPM | DIASTOLIC BLOOD PRESSURE: 75 MMHG | TEMPERATURE: 96.8 F | RESPIRATION RATE: 18 BRPM

## 2024-04-16 PROCEDURE — 99214 OFFICE O/P EST MOD 30 MIN: CPT

## 2024-04-16 RX ORDER — SUCRALFATE 1 G/1
1 TABLET ORAL
Qty: 30 | Refills: 5 | Status: ACTIVE | COMMUNITY
Start: 2023-04-06 | End: 1900-01-01

## 2024-04-27 NOTE — REASON FOR VISIT
[FreeTextEntry1] : 63 year old female with moderate-severe CAD by CTA with calcium score of 177 (11/2022), s/p LCx stent 9/21/23 at [facility], cardiomyopathy EF 50%, LBBB, HTN, HLD, pre-DM (6.6%), who presents for followup.  Patient was last seen on 10/30/23 - Patient reports left-sided CP not related to exertion.  She is on Amlodipine 5 mg , Losartan 25 mg for HTN. She is on ASA 81 mg, Plavix 75 mg, Atorvastatin 40 mg for CAD.  HR a little elevated.  I advised patient to resume Metoprolol ER 25 mg.  I advised patient to resume Famotidine 20 mg.  FU 3 months.    She is on Amlodipine 5 mg , Losartan 25 mg for HTN. She is on ASA 81 mg, Plavix 75 mg, Atorvastatin 40 mg for CAD.  She is on DM medication.  She is OFF Metoprolol ER 25 mg.  Cath 9/21/23 at [facility] with Dr. Long showed 50-60% LAD, 80-85% LCx, LVEF 50% with apical AK, LVEDP 18, s/p LCx stent.  Patient underwent an echocardiogram 11/10/22 and it showed low normal LV function EF 50% without significant valvular pathology.

## 2024-04-27 NOTE — HISTORY OF PRESENT ILLNESS
[FreeTextEntry1] : 4/16/24 - Patient reports lower CP for one month in the mornings that is better with eating. Patient had recent H.Pylori infection.   12/22/23 - Patient reports SSCP in AM, relieved with SL NTG lasting 2-3 minutes.  ECG showed NSR with LBBB.  Patient underwent an echocardiogram and it showed mild systolic LV dysfunction EF 50% without significant valvular pathology.   10/30/23 - Patient reports left-sided CP not related to exertion.  She is on Amlodipine 5 mg , Losartan 25 mg for HTN. She is on ASA 81 mg, Plavix 75 mg, Atorvastatin 40 mg for CAD.  HR a little elevated.  I advised patient to resume Metoprolol ER 25 mg.  I advised patient to resume Famotidine 20 mg.  FU 3 months.    9/6/23 - Patient reports that she had a repeat breath test and was advised to have a repeat treatment for presumed H. Pylori.  Patient could not tolerate and stopped after 10 days of 14 day course.  She continues to have lower CP not related to exertion.  Patient reports throat discomfort.  Patient reports back of neck discomfort.  She is on DM medications now.  She is on Metoprolol ER 25 mg, Amlodipine 5 mg , Losartan 25 mg for HTN. She is on Plavix 75 mg and Atorvastatin 40 mg daily.  I advised patient to undergo a cardiac cath.  Patient requests [facility].  Cath 9/21/23 at [facility] with Dr. Long showed 50-60% LAD, 80-85% LCx, LVEF 50% with apical AK, LVEDP 18, s/p LCx stent.  8/8/23 - Patient reports that her CP is better by 40% but is still persistent. Patient reports CP when anxious and palpitations with heartburn. SOB lasting 1-2 minutes/ 2-3 times a month. Patient is considering cardiac cath after one month. Patient repeated breath test today.   6/26/23 - Patient reports SSCP.  EGD showed esophagitis and gastritis and H. Pylori.  Patient cannot tolerate ASA.    5/15/23 - Patient reports CP is better after switching ASA 81 mg to Plavix 75 mg and taking Carafate.  Patient needs refills.  I advised patient to undergo EGD.  I will consider cardiac cath if EGD is unremarkable.  Patient is cleared for EGD and anesthesia.  She may hold Plavix 75 mg for 5 days prior to procedure.  4/6/23 - Patient reports CP from throat to epigastric  and took SLNTG yesterday. Patient reports SSCP with exertion normally. She reports stomach discomfort, she had EGD in 2018 which was okay. I advised patient to stop ASA 81 mg and switch to Plavix 75 mg. I advised patient to have a trial of carafate. I advised patient to get BT for troponin. Patient reports palpitations lasting seconds. She did not go for cardiac cath as planned previously.   2/17/23 - Pt states that she still occasionally has mid-chest discomfort that radiates up the middle of her chest to her throat. She is taking the medications as prescribed but feels like she has mild stomach upset with ASA 81. She is nervous about LHC, which is scheduled for 2/21/23.  12/5/22 - Pt states that she switched to atorvastatin and started ASA 81. She feels the ASA gives her mild chest discomfort/SOB. I advised her to try aspirin with her acid reflux medication. She states she still has chest discomfort when she runs very quickly but feels okay walking at normal pace. Pt brought in a bottle of metoprolol tartrate from her friend and asked if she could take it.   11/29/22- Pt returns today for f/u. She states she feels well. However, she still states she has mid-sternal chest discomfort that radiates up her throat when she runs quickly or for longer periods of times. No orthopnea, PND, LE edema, palpitations, or lightheadedness. Pravstatin was switched to atorvastatin 40mg at that time. We had long discussion to plan for LHC given septal hypokinesis, low normal EF, chest discomfort and positive coronary CT.  11/10/22 - underwent TTE showing low normal LVEF 50% with paradoxical septal motion and likely septal hypokinesis and mild AI. CTA was done at [facility] which showed Ca 177 score, severe pLAD (70%), moderate pLCx (50-69%), and normal RCA.  11/10/22 - Pt states that 1 year ago, she went to a different cardiologist who did a treadmill stress with ?nuclear testing and stated that she had "equivocal" stress findings and subsequently recommended her to obtain LHC. She did not get this done because she was nervous for the procedure. At baseline, if she walks normally, she does not have symptoms. However, when she runs quickly or fast, she feels mid-sternal chest pain that radiates down from her throat and feels like a "bitter and burning" sensation. This is associated with shortness of breath. She also states that she would feel the same symptoms in the cold weather (wintertime) when cold wind blows towards her face, but did not feel these symptoms in the summer. She denies any palpitations, dizziness, lightheadedness, or LOC. No orthopnea/PND or LE edema.   She takes amlodipine 5mg daily, losartan 25mg daily, and pravastatin 40mg daily.